# Patient Record
Sex: MALE | Race: WHITE
[De-identification: names, ages, dates, MRNs, and addresses within clinical notes are randomized per-mention and may not be internally consistent; named-entity substitution may affect disease eponyms.]

---

## 2019-06-15 ENCOUNTER — HOSPITAL ENCOUNTER (EMERGENCY)
Dept: HOSPITAL 65 - ER | Age: 34
Discharge: HOME | End: 2019-06-15
Payer: COMMERCIAL

## 2019-06-15 VITALS — DIASTOLIC BLOOD PRESSURE: 80 MMHG | SYSTOLIC BLOOD PRESSURE: 120 MMHG

## 2019-06-15 VITALS — BODY MASS INDEX: 26.51 KG/M2 | WEIGHT: 200 LBS | HEIGHT: 73 IN

## 2019-06-15 VITALS — SYSTOLIC BLOOD PRESSURE: 120 MMHG | DIASTOLIC BLOOD PRESSURE: 80 MMHG

## 2019-06-15 DIAGNOSIS — R11.2: ICD-10-CM

## 2019-06-15 DIAGNOSIS — R10.11: Primary | ICD-10-CM

## 2019-06-15 DIAGNOSIS — F17.210: ICD-10-CM

## 2019-06-15 DIAGNOSIS — I25.2: ICD-10-CM

## 2019-06-15 DIAGNOSIS — J45.909: ICD-10-CM

## 2019-06-15 LAB
ALP INTEST CFR SERPL: 77 U/L (ref 50–136)
ALT SERPL-CCNC: 32 U/L (ref 12–78)
AST SERPL-CCNC: 17 U/L (ref 0–35)
BASOPHILS # BLD AUTO: 0.1 10^3/UL (ref 0–0.1)
BASOPHILS NFR BLD AUTO: 0.7 % (ref 0–0.2)
CALCIUM SERPL-MCNC: 9.3 MG/DL (ref 8.4–10.5)
CO2 BLDA-SCNC: 27.4 MMOL/L (ref 20–32)
EOSINOPHIL # BLD AUTO: 0.3 10^3/UL (ref 0–0.2)
EOSINOPHIL NFR BLD AUTO: 3.4 % (ref 0–5)
ERYTHROCYTE [DISTWIDTH] IN BLOOD BY AUTOMATED COUNT: 13.8 % (ref 11.5–14.5)
GLUCOSE PRE 100 G GLC PO SERPL-MCNC: 104 MG/DL (ref 70–110)
HGB BLD-MCNC: 15.1 G/DL (ref 13.9–16.3)
LYMPHOCYTES # BLD AUTO: 2.3 10^3/UL (ref 1–4.8)
LYMPHOCYTES NFR BLD AUTO: 31.7 % (ref 24–44)
MCH RBC QN AUTO: 31.4 PG (ref 26–34)
MCHC RBC AUTO-ENTMCNC: 35 G/DL (ref 33–37)
MCV RBC AUTO: 89.8 FL (ref 78–100)
MONOCYTES # BLD AUTO: 0.5 10^3/UL (ref 0.3–0.8)
MONOCYTES NFR BLD AUTO: 7.2 % (ref 5–12)
NEUTROPHILS # BLD AUTO: 4.1 10^3/UL (ref 1.8–7.7)
NEUTROPHILS NFR BLD AUTO: 56.7 % (ref 41–85)
PLATELET # BLD AUTO: 339 10^3/UL (ref 150–400)
PMV BLD AUTO: 10 FL (ref 7.8–11)
WBC # BLD AUTO: 7.3 10^3/UL (ref 4.5–11)

## 2019-06-15 PROCEDURE — 83690 ASSAY OF LIPASE: CPT

## 2019-06-15 PROCEDURE — 74176 CT ABD & PELVIS W/O CONTRAST: CPT

## 2019-06-15 PROCEDURE — 86677 HELICOBACTER PYLORI ANTIBODY: CPT

## 2019-06-15 PROCEDURE — 36415 COLL VENOUS BLD VENIPUNCTURE: CPT

## 2019-06-15 PROCEDURE — 80053 COMPREHEN METABOLIC PANEL: CPT

## 2019-06-15 PROCEDURE — 85610 PROTHROMBIN TIME: CPT

## 2019-06-15 PROCEDURE — 82150 ASSAY OF AMYLASE: CPT

## 2019-06-15 PROCEDURE — 99285 EMERGENCY DEPT VISIT HI MDM: CPT

## 2019-06-15 PROCEDURE — 85025 COMPLETE CBC W/AUTO DIFF WBC: CPT

## 2019-06-15 PROCEDURE — 86140 C-REACTIVE PROTEIN: CPT

## 2019-06-15 PROCEDURE — 85730 THROMBOPLASTIN TIME PARTIAL: CPT

## 2019-06-15 NOTE — DIREP
PROCEDURE:CT ABDOMEN/PELVIS W/O CONTRAST

 

COMPARISON:None.

 

INDICATIONS:RLQ PAIN

 

TECHNIQUE:Axial images were created through the abdomen and pelvis without 

intravenous contrast material. 

No oral contrast was administered.

Sagittal and coronal reconstructions were performed from source images.

 

FINDINGS:

LUNG BASES:Bibasilar dependent atelectatic change.

LIVER:Diffuse hypoattenuation, consistent with fatty change.  No focal lesion.

BILIARY:Normal.  No visible dilatation or calcification.  

PANCREAS:Normal.  No lesion, fluid collection, ductal dilatation, or atrophy.  

 

SPLEEN:Normal.  No enlargement or focal lesion. 

ADRENALS:Normal.  No mass or enlargement.  

URINARY TRACT:No hydronephrosis, nephrolithiasis, or ureteral calculi.  

Urinary bladder underdistended, limiting evaluation.  

AORTA/VASCULAR:Limited without IV contrast.  No aortic aneurysmal dilatation.

RETROPERITONEUM:Normal.  No mass or adenopathy.  

BOWEL/MESENTERY:No free air.  Lack of oral contrast limits evaluation of the 

bowel structures.  No small bowel dilatation seen to suggest obstruction.  

Appendix not definitively identified.  No definite right lower quadrant 

inflammatory changes are identified.

ABDOMINAL WALL:Normal.  No mass or hernia.  

PELVIC ORGANS:Normal.  No visible mass.  Pelvic organs appropriate for patient 

age.  

BONES:Normal for age.  No bony lesion or acute fracture.  

OTHER:Negative.  

 

CONCLUSION:

1. Appendix not definitively identified.  No right lower quadrant inflammatory 

change.

2. No bowel obstruction.

3. Hepatic steatosis without focal lesion.

4. Additional findings as described.  

 

 

 

Dictated by: Rosales Knox MD on 06/15/2019 at 08:16 AM     

Electronically Signed By: Rosales Knox MD on 06/15/2019 at 08:26 AM

## 2019-06-15 NOTE — NUR
ARRIVAL

PATIENT ARRIVED TO ER ACCOMPANIED BY SPOUSE. PT C/O RLQ RATED IT 4/10 AT THIS 
TIME BUT STATED IT GETS TO A 9/10. PAIN WOKE HIM UP AT 0200 AND VOMITTED AND 
AGAIN AT 0530. TRIAGE DONE DR PAULINO.

## 2019-06-15 NOTE — ER.PDOC
General


Chief Complaint:  Abdomen Pain


Stated Complaint:  RT SIDE ABD PAIN, RLQ > RUQ


Time seen by MD:  08:33


Source:  patient


Exam Limitations:  no limitations





History of Present Illness


Timing/Duration:  4-6 hours


Severity/Quality:  mild


Radiation:  no radiation


Associated Symptoms:  nausea/vomiting


Relieved By:  remaining still


Allergies:  


Coded Allergies:  


     No Known Allergies (Unverified , 8/5/13)


Home Meds


No Active Prescriptions or Reported Meds


Vital Signs





First Vital Signs








  Date Time  Temp Pulse Resp B/P (MAP) Pulse Ox O2 Delivery O2 Flow Rate FiO2


 


6/15/19 07:22 97.8 79 18     





 97.8       


 


6/15/19 07:22    120/80 (93) 97 Room Air  








Last Vital Signs








  Date Time  Temp Pulse Resp B/P (MAP) Pulse Ox O2 Delivery O2 Flow Rate FiO2


 


6/15/19 07:22  76 18  96 Room Air  


 


6/15/19 07:22 97.8   120/80 (93)    





 97.8       











Past Medical History


Medical History:  asthma, cardiac problems, heart attack


Surgical History:  no surgical history





Social History


Smoking:  greater than 1 pack/day


Alcohol Use:  rarely


Drug Use:  none





Reviewed


Nursing Reviewed:  Vital Signs, Abn. Noted





All Other Systems:  Reviewed and Negative





Physical Exam


General Appearance:  No Apparent Distress, WD/WN


HEENT:  PERRL/EOMI, Normal ENT Inspection, TMs Normal, Pharynx Normal


Neck:  Non-Tender, Full Range of Motion, Supple, Normal Inspection


Respiratory:  chest non-tender, lungs clear, normal breath sounds, no 

respiratory distress, no accessory muscle use


Cardiovascular:  Normal Peripheral Pulses, Regular Rate, Rhythm, No Edema, No 

Gallop, No JVD, No Murmur


Gastrointestinal:  McBurneys point tender, Other (RUQ TENDERNESS)


Back:  Normal Inspection, No CVA Tenderness, No Vertebral Tenderness


Extremities:  Normal Range of Motion, Non-Tender, Normal Inspection, No Pedal 

Edema, No Calf Tenderness, Normal Capillary Refill, Pelvis Stable


Neurologic/Psychiatric:  CNs II-XII NML as Tested, No Motor/Sensory Deficits, 

Alert, Normal Mood/Affect, Oriented x 3


Skin:  Normal Color, Warm/Dry


Lymphatic:  No Adenopathy





Results/Orders


Results/Orders





Orders - ANDRZEJ BARILLAS MD


Cbc With Auto Diff (6/15/19 07:42)


Comprehensive Metabolic Panel (6/15/19 07:42)


Amylase (6/15/19 07:42)


Lipase (6/15/19 07:42)


Helicobacter Pylori (6/15/19 07:42)


PT (6/15/19 07:42)


Partial Thromboplastin Time. (6/15/19 07:42)


Urinalysis (6/15/19 07:42)


Saline Lock (6/15/19 07:42)


Ct Abd/Pelvis Wo Iv Contrast (6/15/19 07:42)


0.9 % Sodium Chloride (Ns 1000ml) (6/15/19 08:00)


C-Reactive Protein (6/15/19 07:49)





Vital Signs








  Date Time  Temp Pulse Resp B/P (MAP) Pulse Ox O2 Delivery O2 Flow Rate FiO2


 


6/15/19 07:22  76 18  96 Room Air  


 


6/15/19 07:22 97.8 79 18 120/80 (93) 97 Room Air  





 97.8       


 


6/15/19 07:22 97.8 79 18     





 97.8       








                                Laboratory Tests








Test


 6/15/19


07:42 6/15/19


07:50


 


White Blood Count


 7.3 10^3/uL


(4.5-11.0) 





 


Red Blood Count


 4.81 10^6/uL


(4.50-5.90) 





 


Hemoglobin


 15.1 g/dL


(13.9-16.3) 





 


Hematocrit


 43.2 %


(37.0-53.0) 





 


Mean Corpuscular Volume


 89.8 fL


() 





 


Mean Corpuscular Hemoglobin


 31.4 pg


(26-34) 





 


Mean Corpuscular Hemoglobin


Concent 35.0 g/dL


(33-37) 





 


Red Cell Distribution Width


 13.8 %


(11.5-14.5) 





 


Platelet Count


 339 10^3/uL


(150-400) 





 


Mean Platelet Volume


 10.0 fL


(7.8-11.0) 





 


Neutrophils (%) (Auto)


 56.7 %


(41.0-85.0) 





 


Lymphocytes (%) (Auto)


 31.7 %


(24.0-44.0) 





 


Monocytes (%) (Auto)


 7.2 %


(5.0-12.0) 





 


Neutrophils # (Auto)


 4.1 10^3/uL


(1.8-7.7) 





 


Lymphocytes # (Auto)


 2.3 10^3/uL


(1.0-4.8) 





 


Monocytes # (Auto)


 0.5 10^3/uL


(0.3-0.8) 





 


Absolute Immature Granulocyte


(auto 0.02 10^3 u/L


(0-2) 





 


Immature Granulocytes %


 0.30 %


(0.00-0.50) 





 


Eosinophils %


 3.4 %


(0.0-5.0) 





 


Basophils %


 0.7 %


(0.0-0.2)  H 





 


Basophils #


 0.1 10^3/uL


(0.0-0.1) 





 


Eosinophil Count


 0.3 10^3/uL


(0.0-0.2)  H 





 


Prothrombin Time


 


 9.8 SEC


(9.8-11.9)


 


Prothrombin Time INR


(Non-Therap) 


 1.0  





 


PTT


 


 24.8 SEC


(24.67-30.72)


 


Sodium Level


 


 143 mmol/L


(132-145)


 


Potassium Level


 


 4.2 mmol/L


(3.6-5.2)


 


Chloride Level


 


 106.0 mmol/L


()


 


Carbon Dioxide Level


 


 27.4 mmol/L


(20.0-32)


 


Anion Gap  13.8  


 


Blood Urea Nitrogen


 


 15 mg/dL


(7-18)


 


Creatinine


 


 1.10 mg/dL


(0.59-1.40)


 


Estimated GFR (


American) 


 91.6 (>/=60)  





 


BUN/Creatinine Ratio  13.0  


 


Glucose Level


 


 104 mg/dL


()


 


Calcium Level


 


 9.3 mg/dL


(8.4-10.5)


 


Total Bilirubin


 


 0.2 mg/dL


(0.2-1.0)


 


Aspartate Amino Transferase


(AST) 


 17 U/L (0-35)  





 


Alanine Aminotransferase (ALT)


 


 32 U/L (12-78)





 


Alkaline Phosphatase


 


 77 U/L


()


 


C-Reactive Protein


 


 0.86 mg/dL


(0.00-5.00)


 


Total Protein


 


 7.8 g/dL


(6.4-8.2)


 


Albumin


 


 3.9 g/dL


(3.4-5.0)


 


Globulin  3.9  


 


Amylase Level


 


 41 U/L


()


 


Lipase


 


 119 U/L


(114-286)


 


Helicobacter pylori Screen


 


 POSITIVE


(NEGATIVE)











Course


Vitals & review Data





Vital Sign - Last 24 Hours








 6/15/19 6/15/19 6/15/19





 07:22 07:22 07:22


 


Temp 97.8 97.8 





 97.8 97.8 


 


Pulse 79 79 76


 


Resp 18 18 18


 


B/P (MAP)  120/80 (93) 


 


Pulse Ox  97 96


 


O2 Delivery  Room Air Room Air








Laboratory Tests








Test


 6/15/19


07:42 6/15/19


07:50


 


White Blood Count 7.3 10^3/uL  


 


Red Blood Count 4.81 10^6/uL  


 


Hemoglobin 15.1 g/dL  


 


Hematocrit 43.2 %  


 


Mean Corpuscular Volume 89.8 fL  


 


Mean Corpuscular Hemoglobin 31.4 pg  


 


Mean Corpuscular Hemoglobin


Concent 35.0 g/dL 


 





 


Red Cell Distribution Width 13.8 %  


 


Platelet Count 339 10^3/uL  


 


Mean Platelet Volume 10.0 fL  


 


Neutrophils (%) (Auto) 56.7 %  


 


Lymphocytes (%) (Auto) 31.7 %  


 


Monocytes (%) (Auto) 7.2 %  


 


Neutrophils # (Auto) 4.1 10^3/uL  


 


Lymphocytes # (Auto) 2.3 10^3/uL  


 


Monocytes # (Auto) 0.5 10^3/uL  


 


Absolute Immature Granulocyte


(auto 0.02 10^3 u/L 


 





 


Immature Granulocytes % 0.30 %  


 


Eosinophils % 3.4 %  


 


Basophils % 0.7 %  


 


Basophils # 0.1 10^3/uL  


 


Eosinophil Count 0.3 10^3/uL  


 


Prothrombin Time  9.8 SEC 


 


Prothrombin Time INR


(Non-Therap) 


 1.0 





 


Activated Partial


Thromboplast Time 


 24.8 SEC 





 


Sodium Level  143 mmol/L 


 


Potassium Level  4.2 mmol/L 


 


Chloride Level  106.0 mmol/L 


 


Carbon Dioxide Level  27.4 mmol/L 


 


Anion Gap  13.8 


 


Blood Urea Nitrogen  15 mg/dL 


 


Creatinine  1.10 mg/dL 


 


Estimated GFR (


American) 


 91.6 





 


BUN/Creatinine Ratio  13.0 


 


Glucose Level  104 mg/dL 


 


Calcium Level  9.3 mg/dL 


 


Total Bilirubin  0.2 mg/dL 


 


Aspartate Amino Transf


(AST/SGOT) 


 17 U/L 





 


Alanine Aminotransferase


(ALT/SGPT) 


 32 U/L 





 


Alkaline Phosphatase  77 U/L 


 


C-Reactive Protein  0.86 mg/dL 


 


Total Protein  7.8 g/dL 


 


Albumin  3.9 g/dL 


 


Globulin  3.9 


 


Amylase Level  41 U/L 


 


Lipase  119 U/L 


 


Helicobacter pylori Screen  POSITIVE 








O2 Sat by Pulse Oximetry:  97





Departure


Time of Disposition:  09:00


Disposition:  01 HOME, SELF-CARE


Impression:  


   Primary Impression:  


   Abdominal pain


Condition:  Stable


Referrals:  


PCP,UNKNOWN (PCP)


PRIMARY CARE PROVIDER


Scripts


No Active Prescriptions or Reported Meds


Comments


RETURN IN 24 HRS


Duration or Time Spent with Pa:  1 HR











ANDRZEJ BARILLAS MD              Darrin 15, 2019 08:40

## 2019-06-17 ENCOUNTER — HOSPITAL ENCOUNTER (EMERGENCY)
Dept: HOSPITAL 65 - ER | Age: 34
Discharge: HOME | End: 2019-06-17
Payer: COMMERCIAL

## 2019-06-17 VITALS — DIASTOLIC BLOOD PRESSURE: 87 MMHG | SYSTOLIC BLOOD PRESSURE: 131 MMHG

## 2019-06-17 VITALS — DIASTOLIC BLOOD PRESSURE: 77 MMHG | SYSTOLIC BLOOD PRESSURE: 121 MMHG

## 2019-06-17 VITALS — SYSTOLIC BLOOD PRESSURE: 145 MMHG | DIASTOLIC BLOOD PRESSURE: 69 MMHG

## 2019-06-17 VITALS — BODY MASS INDEX: 31.81 KG/M2 | WEIGHT: 240 LBS | HEIGHT: 73 IN

## 2019-06-17 DIAGNOSIS — F17.210: ICD-10-CM

## 2019-06-17 DIAGNOSIS — R11.2: ICD-10-CM

## 2019-06-17 DIAGNOSIS — R10.31: Primary | ICD-10-CM

## 2019-06-17 LAB
ALP INTEST CFR SERPL: 76 U/L (ref 50–136)
ALT SERPL-CCNC: 35 U/L (ref 12–78)
APPEARANCE UR: CLEAR
AST SERPL-CCNC: 19 U/L (ref 0–35)
BASOPHILS # BLD AUTO: 0 10^3/UL (ref 0–0.1)
BASOPHILS NFR BLD AUTO: 0.4 % (ref 0–0.2)
BILIRUB UR STRIP.AUTO-MCNC: NEGATIVE MG/DL
CALCIUM SERPL-MCNC: 9.5 MG/DL (ref 8.4–10.5)
CO2 BLDA-SCNC: 30 MMOL/L (ref 20–32)
COLOR UR: YELLOW
EOSINOPHIL # BLD AUTO: 0.1 10^3/UL (ref 0–0.2)
EOSINOPHIL NFR BLD AUTO: 1.3 % (ref 0–5)
ERYTHROCYTE [DISTWIDTH] IN BLOOD BY AUTOMATED COUNT: 13.7 % (ref 11.5–14.5)
GLUCOSE PRE 100 G GLC PO SERPL-MCNC: 81 MG/DL (ref 70–110)
HGB BLD-MCNC: 14.9 G/DL (ref 13.9–16.3)
LYMPHOCYTES # BLD AUTO: 2.4 10^3/UL (ref 1–4.8)
LYMPHOCYTES NFR BLD AUTO: 31.1 % (ref 24–44)
MCH RBC QN AUTO: 30.7 PG (ref 26–34)
MCHC RBC AUTO-ENTMCNC: 34.3 G/DL (ref 33–37)
MCV RBC AUTO: 89.5 FL (ref 78–100)
MONOCYTES # BLD AUTO: 0.5 10^3/UL (ref 0.3–0.8)
MONOCYTES NFR BLD AUTO: 6 % (ref 5–12)
NEUTROPHILS # BLD AUTO: 4.7 10^3/UL (ref 1.8–7.7)
NEUTROPHILS NFR BLD AUTO: 61.1 % (ref 41–85)
PLATELET # BLD AUTO: 343 10^3/UL (ref 150–400)
PMV BLD AUTO: 9.8 FL (ref 7.8–11)
UROBILINOGEN UR QL STRIP.AUTO: NORMAL
WBC # BLD AUTO: 7.6 10^3/UL (ref 4.5–11)

## 2019-06-17 PROCEDURE — 85025 COMPLETE CBC W/AUTO DIFF WBC: CPT

## 2019-06-17 PROCEDURE — 99285 EMERGENCY DEPT VISIT HI MDM: CPT

## 2019-06-17 PROCEDURE — 96360 HYDRATION IV INFUSION INIT: CPT

## 2019-06-17 PROCEDURE — 81002 URINALYSIS NONAUTO W/O SCOPE: CPT

## 2019-06-17 PROCEDURE — 80053 COMPREHEN METABOLIC PANEL: CPT

## 2019-06-17 PROCEDURE — 85730 THROMBOPLASTIN TIME PARTIAL: CPT

## 2019-06-17 PROCEDURE — 36415 COLL VENOUS BLD VENIPUNCTURE: CPT

## 2019-06-17 PROCEDURE — 82150 ASSAY OF AMYLASE: CPT

## 2019-06-17 PROCEDURE — 85610 PROTHROMBIN TIME: CPT

## 2019-06-17 PROCEDURE — 83690 ASSAY OF LIPASE: CPT

## 2019-06-17 PROCEDURE — 74177 CT ABD & PELVIS W/CONTRAST: CPT

## 2019-06-17 NOTE — ER.PDOC
General


Chief Complaint:  Abdomen Pain


Stated Complaint:  LOWER RIGHT AB PAIN


Time seen by MD:  17:10


Source:  patient


Exam Limitations:  no limitations





History of Present Illness


Initial Comments


Pt started on Friday with RLQ pain, he was seen in ER on Saturday and told that 

if pain continued that he should return to be re evaluated, pain continues and 

worse


Timing/Duration:  1 week


Severity/Quality:  moderate, sharpness


Radiation:  RLQ, groin


Associated Symptoms:  nausea/vomiting


Exacerbated by:  movements


Relieved By:  nothing


Allergies:  


Coded Allergies:  


     No Known Allergies (Unverified , 8/5/13)


Home Meds


No Active Prescriptions or Reported Meds


Vital Signs





First Vital Signs








  Date Time  Temp Pulse Resp B/P (MAP) Pulse Ox O2 Delivery O2 Flow Rate FiO2


 


6/15/19 08:48  79      


 


6/17/19 16:35 97.7  17     





 97.7       


 


6/17/19 16:35     97 Room Air  


 


6/17/19 16:49    121/77 (92)    








Last Vital Signs








  Date Time  Temp Pulse Resp B/P (MAP) Pulse Ox O2 Delivery O2 Flow Rate FiO2


 


6/17/19 16:49 97.7 77 17 121/77 (92) 97 Room Air  





 97.7       











Past Medical History


Medical History:  no pertinent history


Surgical History:  no surgical history





Social History


Smoking:  greater than 1 pack/day


Alcohol Use:  occassionally


Drug Use:  none





Constitutional:  no symptoms reported


EENTM:  no symptoms reported


Respiratory:  no symptoms reported


Cardiovascular:  no symptoms reported


Gastrointestinal:  see HPI


Genitourinary:  no symptoms reported


Musculoskeletal:  no symptoms reported


Skin:  no symptoms reported


Psychiatric/Neurological:  no symptoms reported


Endocrine:  no symptoms reported


Hematologic/Lymphatic:  no symptoms reported





Physical Exam


General Appearance:  No Apparent Distress, WD/WN


HEENT:  PERRL/EOMI, Normal ENT Inspection, TMs Normal, Pharynx Normal


Neck:  Non-Tender, Full Range of Motion, Supple, Normal Inspection


Respiratory:  chest non-tender, lungs clear, normal breath sounds, no 

respiratory distress, no accessory muscle use


Cardiovascular:  Normal Peripheral Pulses, Regular Rate, Rhythm, No Edema, No 

Gallop, No JVD, No Murmur


Gastrointestinal:  Normal Bowel Sounds, No Organomegaly, No Pulsatile Mass, 

Soft, Rebound (RLQ), Tenderness, McBurneys point tender


Back:  Normal Inspection, No CVA Tenderness, No Vertebral Tenderness


Extremities:  Normal Range of Motion, Non-Tender, Normal Inspection, No Pedal 

Edema, No Calf Tenderness, Normal Capillary Refill, Pelvis Stable


Neurologic/Psychiatric:  CNs II-XII NML as Tested, No Motor/Sensory Deficits, 

Alert, Normal Mood/Affect, Oriented x 3


Skin:  Normal Color, Warm/Dry


Lymphatic:  No Adenopathy





Results/Orders


Results/Orders





Vital Signs








  Date Time  Temp Pulse Resp B/P (MAP) Pulse Ox O2 Delivery O2 Flow Rate FiO2


 


6/17/19 16:49 97.7 77 17 121/77 (92) 97 Room Air  





 97.7       


 


6/17/19 16:35 97.7 77 16  97 Room Air  





 97.7       


 


6/17/19 16:35 97.7 77 17     





 97.7       











Course


Duration or Total Time Spent w:  1 HR


Vitals & review Data





Vital Sign - Last 24 Hours








 6/17/19 6/17/19 6/17/19





 16:35 16:35 16:49


 


Temp 97.7 97.7 97.7





 97.7 97.7 97.7


 


Pulse 77 77 77


 


Resp 17 16 17


 


B/P (MAP)   121/77 (92)


 


Pulse Ox  97 97


 


O2 Delivery  Room Air Room Air








Sepsis Infection Criteria Pres:  None


O2 Sat by Pulse Oximetry:  97





Departure


Time of Disposition:  20:14


Disposition:  01 HOME, SELF-CARE


Impression:  


   Primary Impression:  


   Right lower quadrant abdominal pain


Condition:  Stable


Patient Instructions:  Abdominal Pain


Referrals:  


PCP,UNKNOWN (PCP)


PRIMARY CARE PROVIDER


Scripts


No Active Prescriptions or Reported Meds


Duration or Time Spent with Pa:  25 BASILICO,LEOPOLDO M MD         Jun 17, 2019 17:18

## 2019-06-17 NOTE — DIREP
PROCEDURE:CT ABD/PELVIS WITH CONTRAST

 

TECHNIQUE:The patient drank oral contrast material.  Following the intravenous 

administration of contrast material, arterial phase cuts were obtained through 

the abdomen, followed by venous phase cuts through the abdomen and pelvis.  The 

images were viewed at lung and soft tissue settings.  Sagittal and coronal 

reconstructions are provided.   

 

COMPARISON:[facility], CT, CT ABD/PELVIS W/O, 06/15/2019, 07:49 

AM.

 

INDICATIONS:RLQ pain

 

FINDINGS:

LOWER CHEST:Minimal linear atelectasis is noted in the right lung base.

LIVER:Hepatic steatosis is noted.  No intrahepatic mass is seen.

BILIARY:Normal.

PANCREAS:Normal.

SPLEEN:Normal.

URINARY TRACT:No hydronephrosis is seen.  No nephrolithiasis is noted.

ADRENALS:Normal.

AORTA/VASCULAR:Normal.

RETROPERITONEUM:Normal.

BOWEL/MESENTERY:Although the appendix is not identified with certainty, there 

is no indirect evidence such as inflammatory changes within the mesenteric fat 

to suggest appendicitis.  There is a question of visualization of the proximal 

most aspect of the appendix which could represent a postoperative appendiceal 

stump.

ABDOMINAL WALL:Normal.

PELVIS:Normal.

BONES:Normal.

OTHER:Normal.

 

CONCLUSION:

1.  I do not see any CT evidence of appendicitis.  The entirety the appendix is 

not definitely visualized but no inflammatory changes of the mesenteric fat or 

periappendiceal abscess are seen.  There is question of visualization of a tiny 

portion of the appendix which may represent a postoperative appendiceal stump.

2.  Hepatic steatosis.  

 

 

Dictated by: Miguel Fajardo M.D. on 06/17/2019 at 07:50 PM     

Electronically Signed By: Miguel Fajardo M.D. on 06/17/2019 at 07:59 PM

## 2021-07-20 ENCOUNTER — HOSPITAL ENCOUNTER (EMERGENCY)
Dept: HOSPITAL 65 - ER | Age: 36
End: 2021-07-20
Payer: COMMERCIAL

## 2021-07-20 VITALS — SYSTOLIC BLOOD PRESSURE: 142 MMHG | DIASTOLIC BLOOD PRESSURE: 93 MMHG

## 2021-07-20 VITALS — HEIGHT: 73 IN | WEIGHT: 195 LBS | BODY MASS INDEX: 25.84 KG/M2

## 2021-07-20 VITALS — DIASTOLIC BLOOD PRESSURE: 78 MMHG | SYSTOLIC BLOOD PRESSURE: 135 MMHG

## 2021-07-20 VITALS — DIASTOLIC BLOOD PRESSURE: 93 MMHG | SYSTOLIC BLOOD PRESSURE: 142 MMHG

## 2021-07-20 DIAGNOSIS — F17.210: ICD-10-CM

## 2021-07-20 DIAGNOSIS — M25.562: Primary | ICD-10-CM

## 2021-07-20 DIAGNOSIS — G89.29: ICD-10-CM

## 2021-07-20 PROCEDURE — 99283 EMERGENCY DEPT VISIT LOW MDM: CPT

## 2021-07-20 NOTE — DIREP
PROCEDURE:XRAY KNEE 2 VWS-LT

 

COMPARISON:None.

 

INDICATIONS:pain

 

FINDINGS:

BONES:No acute fracture.

JOINTS:Joint spaces appear relatively preserved.

SOFT TISSUES:Moderate to large suprapatellar joint effusion.

OTHER:No additional findings.

 

CONCLUSION:

1.  No acute osseus abnormality or significant degenerative joint disease.

2.  Moderate to large suprapatellar joint effusion does raise concern for 

potential internal derangement.  Consider further evaluation with MRI as 

clinically warranted.

 

 

Dictated by: Bruce Saenz M.D.  On 07/20/2021 at 02:53 PM     

Electronically Signed By: Bruce Saenz M.D. on 07/20/2021 at 02:54 PM

## 2021-07-20 NOTE — NUR
ARRIVAL

PATIENT ARRIVED TO ED6 AMBULATORY, C/O LEFT KNEE PAIN FOR THE PAST THREE WEEKS, 
STATES HE STANDS AT WORK FOR PRO LONG TIME, DENIES SEEING HIS PCP FOR THIS 
PROBLEM OR TAKING ANY MEDICATIONS PTA, CAME TO THE ED FOR EVAL. DOCTOR BENNY TO 
THE ROOM TO SEE PATIENT.

## 2021-07-20 NOTE — ER.PDOC
General


Chief Complaint:  Extremities


Stated Complaint:  PAIN/LEFT KNEE


Time seen by MD:  14:36


Source:  patient


Exam Limitations:  no limitations





History of Present Illness


Initial Comments


Left knee pain for 3 weeks.  Patient denies injury.  No fever or chills.  Pain 

is worse with movement.


Where:  home


Severity:  moderate


Exacerbated By:  walking movement


Relieved By:  rest


Allergies:  


Coded Allergies:  


     No Known Allergies (Unverified , 8/5/13)


Home Meds


No Active Prescriptions or Reported Meds





Past Medical History


Medical History:  cardiac problems


Surgical History:  cardiac cath





Family History


Significant Family History:  no pertinent family hx





Social History


Smoking:  greater than 1 pack/day


Alcohol Use:  occassionally


Drug Use:  none





Review of Systems


Constitutional:  no symptoms reported


EENTM:  no symptoms reported


Respiratory:  no symptoms reported


Cardiovascular:  no symptoms reported


Gastrointestinal:  no symptoms reported


Musculoskeletal:  see HPI


All Other Systems:  Reviewed and Negative





Physical Exam


General Appearance:  Alert, No Apparent Distress


Lower Extremity:  tenderness (Left knee without deformity, swelling, redness or 

erythema.)


Joint Exam:  joints nml, nml ROM, nml gait/weight bearing


Vascular:  no vascular compromise, pulses full/equal


Neuro/Psych:  sensation nml, motor nml, oriented x3, CN's nml as tested, 

mood/affect nml


Skin:  color nml, warm/dry, no rash


Back/Neck:  nml inspection


EENT:  eyes inspection nml, ENT inspection nml, pharynx nml


Respiratory:  no resp distress, breath sounds nml


CVS:  reg rate & rhythm, heart sounds nml


Abdomen:  non-tender, no organomegaly, no bruit/mass





Results/Orders


Results/Orders





Orders - YOHANA ZAPATA MD


Xr Knee Lt 2v (7/20/21 14:34)


Tramadol Hcl (Ultram) (7/20/21 14:34)


Tramadol Hcl (Ultram) (7/20/21 14:35)





Vital Signs








  Date Time  Temp Pulse Resp B/P (MAP) Pulse Ox O2 Delivery O2 Flow Rate FiO2


 


7/20/21 14:32 98.7 94 18 142/93 (109) 97 Room Air  


 


7/20/21 14:28 98.7 94 18  97   


 


7/20/21 14:28 98.7 94 18 142/93 (109) 97 Room Air  


 


7/20/21 14:28 98.7 94 18     








Administered Medications








 Medications


  (Trade)  Dose


 Ordered  Sig/Maciej


 Route


 PRN Reason  Start Time


 Stop Time Status Last Admin


Dose Admin


 


 Tramadol HCl


  (Ultram)  50 mg  STAT  STAT


 PO


   7/20/21 14:34


 7/20/21 14:35 DC 7/20/21 14:38


50 MG











EKG/XRAY/CT/US


XRAY Comments:  No acute bony abnormality of left knee





ER DEPART


Departure


Time of Disposition:  14:55


Disposition:  01 HOME / SELF CARE / HOMELESS


Impression:  


   Primary Impression:  


   Chronic knee pain


Condition:  Stable


Referrals:  


HANG BOSS (PCP)


PRIMARY CARE PROVIDER





Additional Instructions:  


Diclofenac


Continue with knee brace at home


Follow up with Dr. Nayak next week


Return to ED if worsening symptoms or concerns


Scripts


No Active Prescriptions or Reported Meds


Duration or Time Spent with Pa:  10 min





Problem Qualifiers








   Primary Impression:  


   Chronic knee pain


   Laterality:  left  Qualified Codes:  M25.562 - Pain in left knee; G89.29 - 

   Other chronic pain








YOHANA ZAPATA MD                Jul 20, 2021 14:39 [Negative] : Heme/Lymph